# Patient Record
Sex: FEMALE | Race: WHITE | Employment: OTHER | URBAN - METROPOLITAN AREA
[De-identification: names, ages, dates, MRNs, and addresses within clinical notes are randomized per-mention and may not be internally consistent; named-entity substitution may affect disease eponyms.]

---

## 2017-05-10 ENCOUNTER — OFFICE VISIT (OUTPATIENT)
Dept: ORTHOPEDIC SURGERY | Age: 62
End: 2017-05-10

## 2017-05-10 VITALS
DIASTOLIC BLOOD PRESSURE: 76 MMHG | HEART RATE: 62 BPM | BODY MASS INDEX: 21.26 KG/M2 | WEIGHT: 120 LBS | HEIGHT: 63 IN | SYSTOLIC BLOOD PRESSURE: 149 MMHG

## 2017-05-10 DIAGNOSIS — M54.41 ACUTE RIGHT-SIDED LOW BACK PAIN WITH RIGHT-SIDED SCIATICA: ICD-10-CM

## 2017-05-10 DIAGNOSIS — M54.6 CHRONIC BILATERAL THORACIC BACK PAIN: Primary | ICD-10-CM

## 2017-05-10 DIAGNOSIS — G89.29 CHRONIC BILATERAL THORACIC BACK PAIN: Primary | ICD-10-CM

## 2017-05-10 PROCEDURE — 72100 X-RAY EXAM L-S SPINE 2/3 VWS: CPT | Performed by: NEUROLOGICAL SURGERY

## 2017-05-10 PROCEDURE — 99214 OFFICE O/P EST MOD 30 MIN: CPT | Performed by: NEUROLOGICAL SURGERY

## 2017-05-10 PROCEDURE — 72070 X-RAY EXAM THORAC SPINE 2VWS: CPT | Performed by: NEUROLOGICAL SURGERY

## 2017-06-05 ENCOUNTER — HOSPITAL ENCOUNTER (OUTPATIENT)
Dept: PHYSICAL THERAPY | Age: 62
Discharge: HOME | End: 2017-06-05
Admitting: NEUROLOGICAL SURGERY

## 2017-06-07 ENCOUNTER — HOSPITAL ENCOUNTER (OUTPATIENT)
Dept: PHYSICAL THERAPY | Age: 62
Discharge: HOME | End: 2017-06-07
Admitting: NEUROLOGICAL SURGERY

## 2017-06-13 ENCOUNTER — HOSPITAL ENCOUNTER (OUTPATIENT)
Dept: PHYSICAL THERAPY | Age: 62
Discharge: HOME | End: 2017-06-13
Admitting: NEUROLOGICAL SURGERY

## 2017-06-15 ENCOUNTER — HOSPITAL ENCOUNTER (OUTPATIENT)
Dept: PHYSICAL THERAPY | Age: 62
Discharge: HOME | End: 2017-06-15
Admitting: NEUROLOGICAL SURGERY

## 2017-06-19 ENCOUNTER — HOSPITAL ENCOUNTER (OUTPATIENT)
Dept: PHYSICAL THERAPY | Age: 62
Discharge: HOME | End: 2017-06-19
Admitting: NEUROLOGICAL SURGERY

## 2017-06-22 ENCOUNTER — HOSPITAL ENCOUNTER (OUTPATIENT)
Dept: PHYSICAL THERAPY | Age: 62
Discharge: HOME | End: 2017-06-22
Admitting: NEUROLOGICAL SURGERY

## 2017-06-23 ENCOUNTER — OFFICE VISIT (OUTPATIENT)
Dept: ORTHOPEDIC SURGERY | Age: 62
End: 2017-06-23

## 2017-06-23 VITALS
HEART RATE: 58 BPM | WEIGHT: 121 LBS | BODY MASS INDEX: 21.44 KG/M2 | SYSTOLIC BLOOD PRESSURE: 139 MMHG | HEIGHT: 63 IN | DIASTOLIC BLOOD PRESSURE: 73 MMHG

## 2017-06-23 DIAGNOSIS — G89.29 CHRONIC BILATERAL LOW BACK PAIN WITHOUT SCIATICA: ICD-10-CM

## 2017-06-23 DIAGNOSIS — M54.6 CHRONIC BILATERAL THORACIC BACK PAIN: Primary | ICD-10-CM

## 2017-06-23 DIAGNOSIS — M54.50 CHRONIC BILATERAL LOW BACK PAIN WITHOUT SCIATICA: ICD-10-CM

## 2017-06-23 DIAGNOSIS — G89.29 CHRONIC BILATERAL THORACIC BACK PAIN: Primary | ICD-10-CM

## 2017-06-23 PROCEDURE — 99213 OFFICE O/P EST LOW 20 MIN: CPT | Performed by: NEUROLOGICAL SURGERY

## 2017-06-23 RX ORDER — CIPROFLOXACIN 500 MG/1
TABLET, FILM COATED ORAL
COMMUNITY
Start: 2017-03-22 | End: 2018-09-09

## 2017-06-23 RX ORDER — TRAMADOL HYDROCHLORIDE 50 MG/1
50 TABLET ORAL EVERY 12 HOURS PRN
COMMUNITY
Start: 2017-06-01

## 2017-06-23 RX ORDER — METOPROLOL SUCCINATE 25 MG/1
25 TABLET, EXTENDED RELEASE ORAL DAILY
COMMUNITY
Start: 2017-06-12

## 2017-06-23 RX ORDER — METAXALONE 800 MG/1
TABLET ORAL
COMMUNITY
Start: 2017-06-12 | End: 2018-09-09

## 2017-06-23 RX ORDER — ETODOLAC 400 MG/1
TABLET, FILM COATED ORAL
COMMUNITY
Start: 2017-04-24 | End: 2018-09-09

## 2017-06-23 RX ORDER — ESTRADIOL AND NORETHINDRONE ACETATE 1; .5 MG/1; MG/1
0.5 TABLET ORAL DAILY
COMMUNITY
Start: 2017-04-12

## 2017-06-23 RX ORDER — METRONIDAZOLE 500 MG/1
TABLET ORAL
COMMUNITY
Start: 2017-03-22 | End: 2018-09-09

## 2017-06-23 RX ORDER — GABAPENTIN 300 MG/1
CAPSULE ORAL
COMMUNITY
Start: 2017-06-12 | End: 2018-09-09

## 2017-06-23 RX ORDER — POLYETHYLENE GLYCOL-3350, SODIUM CHLORIDE, POTASSIUM CHLORIDE AND SODIUM BICARBONATE 420; 11.2; 5.72; 1.48 G/438.4G; G/438.4G; G/438.4G; G/438.4G
POWDER, FOR SOLUTION ORAL
COMMUNITY
Start: 2017-05-22 | End: 2018-09-09

## 2017-06-26 ENCOUNTER — HOSPITAL ENCOUNTER (OUTPATIENT)
Dept: PHYSICAL THERAPY | Age: 62
Discharge: HOME | End: 2017-06-26
Admitting: NEUROLOGICAL SURGERY

## 2017-07-06 ENCOUNTER — HOSPITAL ENCOUNTER (OUTPATIENT)
Dept: PHYSICAL THERAPY | Age: 62
Discharge: HOME | End: 2017-07-06
Admitting: ORTHOPAEDIC SURGERY

## 2017-07-06 ENCOUNTER — HOSPITAL ENCOUNTER (OUTPATIENT)
Dept: PHYSICAL THERAPY | Age: 62
Discharge: HOME | End: 2017-06-30
Admitting: ORTHOPAEDIC SURGERY

## 2017-07-13 ENCOUNTER — HOSPITAL ENCOUNTER (OUTPATIENT)
Dept: PHYSICAL THERAPY | Age: 62
Discharge: HOME | End: 2017-07-13
Admitting: ORTHOPAEDIC SURGERY

## 2017-07-27 ENCOUNTER — HOSPITAL ENCOUNTER (OUTPATIENT)
Dept: PHYSICAL THERAPY | Age: 62
Discharge: HOME | End: 2017-07-27
Admitting: ORTHOPAEDIC SURGERY

## 2017-08-03 ENCOUNTER — HOSPITAL ENCOUNTER (OUTPATIENT)
Dept: PHYSICAL THERAPY | Age: 62
Discharge: HOME | End: 2017-08-03
Admitting: ORTHOPAEDIC SURGERY

## 2017-08-18 ENCOUNTER — HOSPITAL ENCOUNTER (OUTPATIENT)
Dept: MAMMOGRAPHY | Age: 62
Discharge: HOME | End: 2017-08-18
Attending: OBSTETRICS & GYNECOLOGY | Admitting: OBSTETRICS & GYNECOLOGY

## 2017-08-18 DIAGNOSIS — Z12.31 VISIT FOR SCREENING MAMMOGRAM: ICD-10-CM

## 2017-08-24 ENCOUNTER — HOSPITAL ENCOUNTER (OUTPATIENT)
Dept: PHYSICAL THERAPY | Age: 62
Discharge: HOME | End: 2017-08-24
Admitting: ORTHOPAEDIC SURGERY

## 2018-06-19 ENCOUNTER — HOSPITAL ENCOUNTER (OUTPATIENT)
Dept: PHYSICAL THERAPY | Age: 63
Discharge: HOME | End: 2018-06-30
Admitting: ORTHOPAEDIC SURGERY

## 2018-07-01 ENCOUNTER — HOSPITAL ENCOUNTER (OUTPATIENT)
Dept: PHYSICAL THERAPY | Age: 63
Discharge: HOME | End: 2018-07-01
Attending: ORTHOPAEDIC SURGERY | Admitting: ORTHOPAEDIC SURGERY

## 2018-07-05 ENCOUNTER — HOSPITAL ENCOUNTER (OUTPATIENT)
Dept: PHYSICAL THERAPY | Age: 63
Discharge: HOME | End: 2018-07-06
Admitting: ORTHOPAEDIC SURGERY

## 2018-07-10 ENCOUNTER — HOSPITAL ENCOUNTER (OUTPATIENT)
Dept: PHYSICAL THERAPY | Age: 63
Discharge: HOME | End: 2018-07-11
Admitting: ORTHOPAEDIC SURGERY

## 2018-07-17 ENCOUNTER — HOSPITAL ENCOUNTER (OUTPATIENT)
Dept: PHYSICAL THERAPY | Age: 63
Setting detail: THERAPIES SERIES
Discharge: HOME | End: 2018-07-17
Payer: COMMERCIAL

## 2018-07-17 NOTE — FLOWSHEET NOTE
[facility] [facility], [address] [place]  Phone: [phone]  Fax [phone]      Date:  2018    Patient Name:  Laura Junior    :  1955  MRN: [id number]  Restrictions/Precautions:    Medical/Treatment Diagnosis Information:  ·  Hist of Thoracolumbar back pain     Physician Information:       Patient is Post-Op [] Yes   [x] No     DOS:           7/10/18 7/17/18        Subjective Still doing good Hard a rough day on , just a lot of arthritic pain  Been stretching more at home and that seems to help                  Weeks Post-Op                    Objective Good hamstring flexibility, limited thoracic mobility, abdominal strength rated as fair Good hamstring flexibility, limited thoracic mobility, abdominal strength rated as fair Hamstrings L 100degrees                       R   95degrees  Difficulty with lumbar stab Good hamstring flexibility, limited thoracic mobility, abdominal strength showing improvements Good hamstring flexibility, limited thoracic mobility, abdominal strength showing improvements, hip disassociation showing improvement               Goals Reduce back pain, gain abdominal strength for spine and pelvic stability Reduce back pain, gain abdominal strength for spine and pelvic stability Reduce back pain, gain abdominal strength for spine and pelvic stability Reduce back pain, gain abdominal strength for spine and pelvic stability Reduce back pain, gain abdominal strength for spine and pelvic stability Reduce back pain, gain abdominal strength for spine and pelvic stability              Reformer Exercises 2R1B Sq w/add (neutral)  2R1B raises 2X10  2R1B U sq 25X   2R1B Sq w/add (neutral)  2R1B raises 2X15 w/stretch  2R1B U sq 30X 2R1B Sq w/add (neutral)  2R1B raises 3X10 w/ U str  2R1B U sq 30X 2R1B Sq w/add (neutral)  2R1B raises 3X10  2R1B U sq 2X20X 2R1B Sq w/add (neutral)  2R1B raises 3X10  2R1B U sq 2X20X 2R1B Sq w/add (neutral)  2R1B raises 3X10  2R1B U sq 2X20X 2R1B sq, raises, walks  2R1B U squats  3R plie   Pelvic Stabilization   1R stand ab/ad 1R1Y stand ab/ad 1R1Y stand ab/ad 1R1Y stand ab/ad #4 1R1Y stand ab/ad #4 1R1Y^ stand ab/ad #4 1R1B stand ab/ad                                 Trunk Stabilization 1R 90/90, alt legs, leg lifts 1R 90/90, alt legs, leg lifts, scissor 1R1Y 90/90, alt legs, leg lifts, scissor 1R1Y 90/90, alt legs, leg lifts, scissor  No flex 1R1Y 90/90, alt legs, leg lifts, scissor  1R flex 2X10 1R1B 90/90, alt legs, leg lifts, modified flex 1R1B alt legs, U leg liftsw, scissor  1R1B modified flex, obl              1R1B kneeling modified plank 1R1B kneeling modified plank 3X15 sec 1R1B kneeling modified plank 3X15 sec 1R1B kneeling modified plank [id number] 1R1B Plank 3X20 1R1B Plank 2X30              Hip Disassociation 2R1Y flossing  2R1Y arcs: ll, v 2R1B flossing  2R1B arcs: ll, v, circles 2R1B arcs: ll, v, circles 2R1B arcs: ll, v, circles 20X 2R1B arcs: ll, v, circles 20X 2R1B II, V, circles, ir/er 2R1B II, V, Circles, frog                    1R1B stand leg press             Scapular Stabilization Box: low row, ext Box: low row, ext, biceps Box: low row, ext, biceps Box: low row, ext, biceps   Box: low row, biceps, triceps                                 Thoracic Mobility 2R1Y 3X10 Bridge w/add 2R1Y 3X10 Bridge w/add 2R 3X10 Bridge w/add 2R 3X10 Bridge w/add w/ext 2R 3X10 Bridge w/add w/ex 2R1Y Bridge w/ext X10 2R1Y Bridge w/ext  2R1Y Bridge 2:1                  1R Box: spine ext 10X 1R Box prone ext 10X              General ROM Hams, pirif, gastroc Hams, pirif, gastroc Hams, pirif, gastroc       Hams, pirif, gastroc Hams, pirif, gastroc Hams, post capsule, lunge Hams, post capsule, lunge                                 Other HEP: TA alt legs, Bridge,  Hams stretch, kneeling alt legs HEP: TA alt legs, Bridge,  Hams stretch, kneeling alt legs Review HEP HEP: bridge, leg lifts prone and supine, hams str, pirif str  Review HEP                                  Summary/Comments                            Electronically signed by:  Margarita English, LAT, ATC

## 2018-07-24 ENCOUNTER — HOSPITAL ENCOUNTER (OUTPATIENT)
Dept: PHYSICAL THERAPY | Age: 63
Setting detail: THERAPIES SERIES
Discharge: HOME | End: 2018-07-24
Payer: COMMERCIAL

## 2018-07-24 NOTE — FLOWSHEET NOTE
kneeling alt legs Review HEP HEP: bridge, leg lifts prone and supine, hams str, pirif str  Review HEP                                  Summary/Comments                            Electronically signed by:  EVERETTE Guillaume, ATC

## 2018-08-02 ENCOUNTER — HOSPITAL ENCOUNTER (OUTPATIENT)
Dept: PHYSICAL THERAPY | Age: 63
Discharge: HOME | End: 2018-08-02
Payer: COMMERCIAL

## 2018-08-02 PROCEDURE — 9990000010 HC NO CHARGE VISIT: Performed by: SPECIALIST/TECHNOLOGIST

## 2018-08-16 ENCOUNTER — HOSPITAL ENCOUNTER (OUTPATIENT)
Dept: PHYSICAL THERAPY | Age: 63
Discharge: HOME | End: 2018-08-16
Payer: COMMERCIAL

## 2018-08-16 PROCEDURE — 9990000010 HC NO CHARGE VISIT: Performed by: SPECIALIST/TECHNOLOGIST

## 2018-08-16 NOTE — FLOWSHEET NOTE
str  2R1B U sq 30X 2R1B Sq w/add (neutral)  2R1B raises 3X10  2R1B U sq 2X20X 2R1B Sq w/add (neutral)  2R1B raises 3X10  2R1B U sq 2X20X 2R1B Sq w/add (neutral)  2R1B raises 3X10  2R1B U sq 2X20X 2R1B sq, raises, walks  2R1B U squats  3R plie   Pelvic Stabilization   1R stand ab/ad 1R1Y stand ab/ad 1R1Y stand ab/ad 1R1Y stand ab/ad #4 1R1Y stand ab/ad #4 1R1Y^ stand ab/ad #4 1R1B stand ab/ad                                 Trunk Stabilization 1R 90/90, alt legs, leg lifts 1R 90/90, alt legs, leg lifts, scissor 1R1Y 90/90, alt legs, leg lifts, scissor 1R1Y 90/90, alt legs, leg lifts, scissor  1R1Y flex, obl 1R1Y 90/90, alt legs, leg lifts, scissor  1R flex 2X10 1R1B 90/90, alt legs, leg lifts, modified flex 1R1B alt legs, U leg liftsw, scissor  1R1B modified flex, obl              1R1B kneeling modified plank 1R1B kneeling modified plank 3X15 sec 1R1B kneeling modified plank 3X15 sec 1R1B  plank 2X1min 1R1B Plank 3X20 1R1B Plank 2X30              Hip Disassociation 2R1Y flossing  2R1Y arcs: ll, v 2R1B flossing  2R1B arcs: ll, v, circles 2R1B arcs: ll, v, circles 2R1B arcs: ll, v, circles, IR/ER 15X 2R1B arcs: ll, v, circles 20X 2R1B II, V, circles, ir/er 2R1B II, V, Circles, frog                    1R1B stand leg press             Scapular Stabilization Box: low row, ext Box: low row, ext, biceps Box: low row, ext, biceps Box: low row, ext, biceps   Box: low row, biceps, triceps                                 Thoracic Mobility 2R1Y 3X10 Bridge w/add 2R1Y 3X10 Bridge w/add 2R 3X10 Bridge w/add 2R 3X10 Bridge w/ext 2R 3X10 Bridge w/add w/ex 2R1Y Bridge w/ext X10 2R1Y Bridge w/ext  2R1Y Bridge 2:1                  1R Box: spine ext 10X 1R Box prone ext 10X              General ROM Hams, pirif, gastroc Hams, pirif, gastroc Hams, pirif, gastroc       Hams, post capsule, gastroc Hams, pirif, gastroc Hams, post capsule, lunge Hams, post capsule, lunge                                 Other HEP: TA alt legs, Bridge,  Hams

## 2018-08-17 ENCOUNTER — HOSPITAL ENCOUNTER (OUTPATIENT)
Dept: MAMMOGRAPHY | Age: 63
Discharge: HOME | End: 2018-08-17
Payer: COMMERCIAL

## 2018-08-17 DIAGNOSIS — Z12.31 VISIT FOR SCREENING MAMMOGRAM: ICD-10-CM

## 2018-08-17 PROCEDURE — 77063 BREAST TOMOSYNTHESIS BI: CPT

## 2018-09-09 ENCOUNTER — HOSPITAL ENCOUNTER (EMERGENCY)
Age: 63
Discharge: HOME | End: 2018-09-09
Payer: COMMERCIAL

## 2018-09-09 ENCOUNTER — APPOINTMENT (OUTPATIENT)
Dept: ULTRASOUND IMAGING | Age: 63
End: 2018-09-09
Payer: COMMERCIAL

## 2018-09-09 VITALS
WEIGHT: 130 LBS | TEMPERATURE: 97.8 F | HEIGHT: 63 IN | SYSTOLIC BLOOD PRESSURE: 119 MMHG | HEART RATE: 74 BPM | RESPIRATION RATE: 11 BRPM | BODY MASS INDEX: 23.04 KG/M2 | OXYGEN SATURATION: 99 % | DIASTOLIC BLOOD PRESSURE: 71 MMHG

## 2018-09-09 DIAGNOSIS — R11.2 NAUSEA AND VOMITING, INTRACTABILITY OF VOMITING NOT SPECIFIED, UNSPECIFIED VOMITING TYPE: Primary | ICD-10-CM

## 2018-09-09 DIAGNOSIS — R10.13 EPIGASTRIC PAIN: ICD-10-CM

## 2018-09-09 LAB
A/G RATIO: 1.5 (ref 1.1–2.2)
ALBUMIN SERPL-MCNC: 4.4 G/DL (ref 3.4–5)
ALP BLD-CCNC: 59 U/L (ref 40–129)
ALT SERPL-CCNC: 16 U/L (ref 10–40)
ANION GAP SERPL CALCULATED.3IONS-SCNC: 14 MMOL/L (ref 3–16)
AST SERPL-CCNC: 24 U/L (ref 15–37)
BASOPHILS ABSOLUTE: 0 K/UL (ref 0–0.2)
BASOPHILS RELATIVE PERCENT: 0.4 %
BILIRUB SERPL-MCNC: 0.7 MG/DL (ref 0–1)
BILIRUBIN URINE: NEGATIVE
BLOOD, URINE: NEGATIVE
BUN BLDV-MCNC: 17 MG/DL (ref 7–20)
CALCIUM SERPL-MCNC: 8.8 MG/DL (ref 8.3–10.6)
CHLORIDE BLD-SCNC: 100 MMOL/L (ref 99–110)
CLARITY: CLEAR
CO2: 27 MMOL/L (ref 21–32)
COLOR: YELLOW
CREAT SERPL-MCNC: <0.5 MG/DL (ref 0.6–1.2)
EKG ATRIAL RATE: 76 BPM
EKG DIAGNOSIS: NORMAL
EKG P AXIS: 52 DEGREES
EKG P-R INTERVAL: 142 MS
EKG Q-T INTERVAL: 400 MS
EKG QRS DURATION: 92 MS
EKG QTC CALCULATION (BAZETT): 450 MS
EKG R AXIS: 39 DEGREES
EKG T AXIS: 31 DEGREES
EKG VENTRICULAR RATE: 76 BPM
EOSINOPHILS ABSOLUTE: 0 K/UL (ref 0–0.6)
EOSINOPHILS RELATIVE PERCENT: 0.5 %
EPITHELIAL CELLS, UA: NORMAL /HPF
GFR AFRICAN AMERICAN: >60
GFR NON-AFRICAN AMERICAN: >60
GLOBULIN: 3 G/DL
GLUCOSE BLD-MCNC: 123 MG/DL (ref 70–99)
GLUCOSE URINE: NEGATIVE MG/DL
HCT VFR BLD CALC: 41.6 % (ref 36–48)
HEMOGLOBIN: 14.4 G/DL (ref 12–16)
KETONES, URINE: NEGATIVE MG/DL
LEUKOCYTE ESTERASE, URINE: NEGATIVE
LIPASE: 20 U/L (ref 13–60)
LYMPHOCYTES ABSOLUTE: 0.9 K/UL (ref 1–5.1)
LYMPHOCYTES RELATIVE PERCENT: 9.7 %
MCH RBC QN AUTO: 33.6 PG (ref 26–34)
MCHC RBC AUTO-ENTMCNC: 34.6 G/DL (ref 31–36)
MCV RBC AUTO: 97.2 FL (ref 80–100)
MICROSCOPIC EXAMINATION: YES
MONOCYTES ABSOLUTE: 0.4 K/UL (ref 0–1.3)
MONOCYTES RELATIVE PERCENT: 4.6 %
NEUTROPHILS ABSOLUTE: 7.9 K/UL (ref 1.7–7.7)
NEUTROPHILS RELATIVE PERCENT: 84.8 %
NITRITE, URINE: NEGATIVE
PDW BLD-RTO: 13.1 % (ref 12.4–15.4)
PH UA: 6.5
PLATELET # BLD: 354 K/UL (ref 135–450)
PMV BLD AUTO: 6.4 FL (ref 5–10.5)
POTASSIUM SERPL-SCNC: 4.1 MMOL/L (ref 3.5–5.1)
PROTEIN UA: ABNORMAL MG/DL
RBC # BLD: 4.28 M/UL (ref 4–5.2)
RBC UA: NORMAL /HPF (ref 0–2)
SODIUM BLD-SCNC: 141 MMOL/L (ref 136–145)
SPECIFIC GRAVITY UA: 1.01
TOTAL PROTEIN: 7.4 G/DL (ref 6.4–8.2)
URINE TYPE: ABNORMAL
UROBILINOGEN, URINE: 0.2 E.U./DL
WBC # BLD: 9.3 K/UL (ref 4–11)
WBC UA: NORMAL /HPF (ref 0–5)

## 2018-09-09 PROCEDURE — 85025 COMPLETE CBC W/AUTO DIFF WBC: CPT

## 2018-09-09 PROCEDURE — 80053 COMPREHEN METABOLIC PANEL: CPT

## 2018-09-09 PROCEDURE — 83690 ASSAY OF LIPASE: CPT

## 2018-09-09 PROCEDURE — 6370000000 HC RX 637 (ALT 250 FOR IP): Performed by: NURSE PRACTITIONER

## 2018-09-09 PROCEDURE — 76705 ECHO EXAM OF ABDOMEN: CPT

## 2018-09-09 PROCEDURE — 93005 ELECTROCARDIOGRAM TRACING: CPT | Performed by: NURSE PRACTITIONER

## 2018-09-09 PROCEDURE — 99284 EMERGENCY DEPT VISIT MOD MDM: CPT

## 2018-09-09 PROCEDURE — 93010 ELECTROCARDIOGRAM REPORT: CPT | Performed by: INTERNAL MEDICINE

## 2018-09-09 PROCEDURE — 81001 URINALYSIS AUTO W/SCOPE: CPT

## 2018-09-09 RX ORDER — ONDANSETRON 4 MG/1
4 TABLET, FILM COATED ORAL EVERY 8 HOURS PRN
Qty: 20 TABLET | Refills: 0 | Status: SHIPPED | OUTPATIENT
Start: 2018-09-09

## 2018-09-09 RX ORDER — SUCRALFATE ORAL 1 G/10ML
1 SUSPENSION ORAL 4 TIMES DAILY
Qty: 1200 ML | Refills: 3 | Status: SHIPPED | OUTPATIENT
Start: 2018-09-09

## 2018-09-09 RX ORDER — OXYCODONE HYDROCHLORIDE AND ACETAMINOPHEN 5; 325 MG/1; MG/1
1 TABLET ORAL ONCE
Status: COMPLETED | OUTPATIENT
Start: 2018-09-09 | End: 2018-09-09

## 2018-09-09 RX ORDER — DICYCLOMINE HYDROCHLORIDE 10 MG/1
10 CAPSULE ORAL
Status: DISCONTINUED | OUTPATIENT
Start: 2018-09-09 | End: 2018-09-09 | Stop reason: HOSPADM

## 2018-09-09 RX ORDER — OMEPRAZOLE 40 MG/1
40 CAPSULE, DELAYED RELEASE ORAL DAILY
Qty: 30 CAPSULE | Refills: 0 | Status: SHIPPED | OUTPATIENT
Start: 2018-09-09

## 2018-09-09 RX ORDER — AMOXICILLIN AND CLAVULANATE POTASSIUM 875; 125 MG/1; MG/1
1 TABLET, FILM COATED ORAL 2 TIMES DAILY
Qty: 20 TABLET | Refills: 0 | Status: SHIPPED | OUTPATIENT
Start: 2018-09-09 | End: 2018-09-19

## 2018-09-09 RX ORDER — OXYCODONE HYDROCHLORIDE AND ACETAMINOPHEN 5; 325 MG/1; MG/1
1-2 TABLET ORAL EVERY 6 HOURS PRN
Qty: 12 TABLET | Refills: 0 | Status: SHIPPED | OUTPATIENT
Start: 2018-09-09 | End: 2018-09-16

## 2018-09-09 RX ADMIN — LIDOCAINE HYDROCHLORIDE: 20 SOLUTION ORAL; TOPICAL at 13:37

## 2018-09-09 RX ADMIN — DICYCLOMINE HYDROCHLORIDE 10 MG: 10 CAPSULE ORAL at 11:17

## 2018-09-09 RX ADMIN — OXYCODONE HYDROCHLORIDE AND ACETAMINOPHEN 1 TABLET: 5; 325 TABLET ORAL at 14:09

## 2018-09-09 ASSESSMENT — PAIN SCALES - GENERAL: PAINLEVEL_OUTOF10: 3

## 2018-09-09 NOTE — ED PROVIDER NOTES
[address] [place] [address]  ED  eMERGENCY dEPARTMENT eNCOUnter        Pt Name: Renae Sherman  MRN: [id number]  [place] 1955  Date of evaluation: 9/9/2018  Provider: ISAIAS White  PCP: General Dao MD      History provided by the patient. CHIEF COMPLAINT:     Chief Complaint   Patient presents with    Abdominal Pain     Pt woke at 0200 with vomiting and abd pain.  Emesis       HISTORY OF PRESENT ILLNESS:      Renae Sherman is a 58 y.o. female who presents to [address] [place] [address]  ED with complaints of Abdominal pain. Patient states that she woke up this morning about 2 AM with some vomiting and abdominal pain. Patient states that the abdominal pain is intermittent and crampy in nature. Patient states that her pain is mostly in the epigastric area and to the right upper quadrant. Patient states that she has a history of diverticulitis however this does not feel like that. Patient states that she's been unable to eat, she does not have her appendix. She denies any fever. Patient states that the pain is dull and constant however then she has intermittent sharp crampy pain. Patient states that she's also had some diarrhea today as well. She is here for further evaluation. Nursing Notes were reviewed     REVIEW OF SYSTEMS:     Review of Systems  All systems, a total of 10, are reviewed and negative except for those that were just noted in history present illness.         PAST MEDICAL HISTORY:     Past Medical History:   Diagnosis Date    High blood pressure          SURGICAL HISTORY:      Past Surgical History:   Procedure Laterality Date    APPENDECTOMY      BUNIONECTOMY      HAND SURGERY           CURRENT MEDICATIONS:       Discharge Medication List as of 9/9/2018  1:23 PM      CONTINUE these medications which have NOT CHANGED    Details   estradiol-norethindrone (ACTIVELLA) 1-0.5 MG per tablet Take 0.5 mg by mouth daily Historical Med      metoprolol succinate (TOPROL XL) 25 MG extended release tablet Take 25 mg by mouth daily Historical Med      traMADol (ULTRAM) 50 MG tablet Take 50 mg by mouth every 12 hours as needed. Elkomiladis Wilkersona Historical Med      amLODIPine (NORVASC) 5 MG tablet Take 5 mg by mouth daily. Historical Med               ALLERGIES:    Patient has no known allergies. FAMILY HISTORY:       Family History   Problem Relation Age of Onset    Stroke Mother     Stroke Father           SOCIAL HISTORY:       Social History     Social History    Marital status:      Spouse name: N/A    Number of children: 0    Years of education: N/A     Occupational History    Mgr      Social History Main Topics    Smoking status: Never Smoker    Smokeless tobacco: Never Used    Alcohol use Yes    Drug use: No    Sexual activity: Not on file     Other Topics Concern    Not on file     Social History Narrative    No narrative on file       SCREENINGS:    [place] Coma Scale  Eye Opening: Spontaneous  Best Verbal Response: Oriented  Best Motor Response: Obeys commands  [place]ann[place] Coma Scale Score: 15        PHYSICAL EXAM:       ED Triage Vitals [09/09/18 1052]   BP Temp Temp Source Pulse Resp SpO2 Height Weight   (!) 148/84 97.8 °F (36.6 °C) Oral 81 20 98 % 5' 3\" (1.6 m) 130 lb (59 kg)       Physical Exam    CONSTITUTIONAL: Awake and alert. Cooperative. Well-developed. Well-nourished. Non-toxic. No acute distress. Vitals:    09/09/18 1052 09/09/18 1204 09/09/18 1338   BP: (!) 148/84 130/67 119/71   Pulse: 81 70 74   Resp: 20 17 11   Temp: 97.8 °F (36.6 °C)     TempSrc: Oral     SpO2: 98% 96% 99%   Weight: 130 lb (59 kg)     Height: 5' 3\" (1.6 m)       HENT: Normocephalic. Atraumatic. External ears normal, without discharge. TMs clear bilaterally. No nasal discharge. Oropharynx clear, no erythema. Mucous membranes moist.  EYES: Conjunctiva non-injected, no lid abnormalities noted. No scleral icterus. PERRL. EOM's grossly intact. Anterior chambers clear. NECK: Supple. Normal ROM. No meningismus. No thyroid tenderness or swelling noted. CARDIOVASCULAR: RRR. No Murmer. Intact distal pulses with no edema. No carotid bruits. PULMONARY/CHEST WALL: Effort normal. No tachypnea. Lungs clear to ausculation. ABDOMEN: Normal BS. Soft. Nondistended. Some diffuse very mild tenderness, there is no focalized tenderness to palpation. No guarding. No hernias noted. No splenomegaly. Back: Spine is midline. No ecchymosis. No crepitus on palpation. No obvious subluxation of vertebral column. No saddle anesthesia or evidence of cauda equina. /ANORECTAL: Not assessed  MUSKULOSKELETAL: Normal ROM. No acute deformities. No edema. No tenderness to palpate. SKIN: Warm and dry. NEUROLOGICAL:  GCS 15. CN II-XII grossly intact. Strength is 5/5 in all extremities and sensation is intact. DTRs normal and symmetric. PSYCHIATRIC: Normal affect, normal insight and judgement. Alert and oriented x 3.         DIAGNOSTIC RESULTS:     LABS:    Results for orders placed or performed during the hospital encounter of 09/09/18   CBC Auto Differential   Result Value Ref Range    WBC 9.3 4.0 - 11.0 K/uL    RBC 4.28 4.00 - 5.20 M/uL    Hemoglobin 14.4 12.0 - 16.0 g/dL    Hematocrit 41.6 36.0 - 48.0 %    MCV 97.2 80.0 - 100.0 fL    MCH 33.6 26.0 - 34.0 pg    MCHC 34.6 31.0 - 36.0 g/dL    RDW 13.1 12.4 - 15.4 %    Platelets 500 492 - 174 K/uL    MPV 6.4 5.0 - 10.5 fL    Neutrophils % 84.8 %    Lymphocytes % 9.7 %    Monocytes % 4.6 %    Eosinophils % 0.5 %    Basophils % 0.4 %    Neutrophils # 7.9 (H) 1.7 - 7.7 K/uL    Lymphocytes # 0.9 (L) 1.0 - 5.1 K/uL    Monocytes # 0.4 0.0 - 1.3 K/uL    Eosinophils # 0.0 0.0 - 0.6 K/uL    Basophils # 0.0 0.0 - 0.2 K/uL   Comprehensive Metabolic Panel   Result Value Ref Range    Sodium 141 136 - 145 mmol/L    Potassium 4.1 3.5 - 5.1 mmol/L    Chloride 100 99 - 110 mmol/L    CO2 27 21 - 32 mmol/L    Anion Gap 14 3 - 16    Glucose 123 (H) 70 - 99 mg/dL    BUN 17 7 - 20 mg/dL

## 2019-08-20 ENCOUNTER — HOSPITAL ENCOUNTER (OUTPATIENT)
Dept: MAMMOGRAPHY | Age: 64
Discharge: HOME | End: 2019-08-20
Payer: MEDICARE

## 2019-08-20 DIAGNOSIS — Z12.31 VISIT FOR SCREENING MAMMOGRAM: ICD-10-CM

## 2019-08-20 PROCEDURE — 77063 BREAST TOMOSYNTHESIS BI: CPT

## 2020-08-26 ENCOUNTER — HOSPITAL ENCOUNTER (OUTPATIENT)
Dept: MAMMOGRAPHY | Age: 65
Discharge: HOME | End: 2020-08-26
Payer: MEDICARE

## 2020-08-26 PROCEDURE — 77063 BREAST TOMOSYNTHESIS BI: CPT

## 2021-08-31 ENCOUNTER — HOSPITAL ENCOUNTER (OUTPATIENT)
Dept: MAMMOGRAPHY | Age: 66
Discharge: HOME | End: 2021-08-31
Payer: MEDICARE

## 2021-08-31 VITALS — WEIGHT: 140 LBS | HEIGHT: 63 IN | BODY MASS INDEX: 24.8 KG/M2

## 2021-08-31 DIAGNOSIS — Z12.31 VISIT FOR SCREENING MAMMOGRAM: ICD-10-CM

## 2021-08-31 PROCEDURE — 77063 BREAST TOMOSYNTHESIS BI: CPT

## 2022-09-01 ENCOUNTER — HOSPITAL ENCOUNTER (OUTPATIENT)
Dept: MAMMOGRAPHY | Age: 67
Discharge: HOME | End: 2022-09-01
Payer: MEDICARE

## 2022-09-01 VITALS — BODY MASS INDEX: 24.8 KG/M2 | WEIGHT: 140 LBS | HEIGHT: 63 IN

## 2022-09-01 DIAGNOSIS — Z12.31 VISIT FOR SCREENING MAMMOGRAM: ICD-10-CM

## 2022-09-01 PROCEDURE — 77063 BREAST TOMOSYNTHESIS BI: CPT

## 2023-08-04 ENCOUNTER — HOSPITAL ENCOUNTER (OUTPATIENT)
Dept: MAMMOGRAPHY | Age: 68
Discharge: HOME | End: 2023-08-04
Payer: MEDICARE

## 2023-08-04 VITALS — WEIGHT: 125 LBS | BODY MASS INDEX: 22.15 KG/M2 | HEIGHT: 63 IN

## 2023-08-04 DIAGNOSIS — Z12.31 VISIT FOR SCREENING MAMMOGRAM: ICD-10-CM

## 2023-08-04 PROCEDURE — 77063 BREAST TOMOSYNTHESIS BI: CPT

## 2023-08-14 ENCOUNTER — HOSPITAL ENCOUNTER (OUTPATIENT)
Dept: WOMENS IMAGING | Age: 68
Discharge: HOME | End: 2023-08-14
Payer: MEDICARE

## 2023-08-14 DIAGNOSIS — R92.8 ABNORMAL MAMMOGRAM: ICD-10-CM

## 2023-08-14 PROCEDURE — G0279 TOMOSYNTHESIS, MAMMO: HCPCS

## 2023-08-14 PROCEDURE — 76642 ULTRASOUND BREAST LIMITED: CPT

## 2023-08-14 NOTE — PROGRESS NOTES
[address] [place] [address]   [address]   Phone: [phone]         ULTRASOUND BIOPSY EDUCATION    NAME:  Jude Ness OF BIRTH:  1955   MEDICAL RECORD NUMBER:  [id number]   TODAY'S DATE:  8/14/2023    Referring Physician: Dr. Bell Angeles    Procedure: U/S Core Bx    Right breast    Date of biopsy: 8/17/23    Patient taking blood thinners: no    Medicine allergies: no    Special Instructions: n/a    Biopsy order form faxed to referring MD.          What is an Ultrasound Guided Breast Biopsy? Ultrasound guided breast biopsy is a test that uses ultrasound to find an area of your breast where a tissue sample will be taken. The sample is then looked at under a microscope to check for signs of breast cancer. Why is it done? An Ultrasound biopsy is usually done to check for cancer in a lump or cyst found during a mammogram or ultrasound. Preparing for the test?     * Take your medications as prescribed    You may eat and drink fluids before the test    Take a shower the evening or morning before the biopsy. What happens before the test?  Images are taken to find the exact site to be biopsied. Your skin is washed with an alcohol prep. You will be given an injection of medication to numb your breast.   What happens during the test?     Once your breast is numb, a small cut (incision) is made. Using the imaging, the doctor will guide the needle into the biopsy area. A sample of breast tissue is taken through the needle. A small \"Clip\" or Marker is inserted into your breast to zackary the biopsy site. The needle is removed and pressure put on the needle site to stop any bleeding. A bandage will be placed over the site. A post mammogram picture will be taken to document the clip placement. How long does the test take? Approximately 60 minutes.   Most of the time is spent finding the area for the

## 2023-08-17 ENCOUNTER — HOSPITAL ENCOUNTER (OUTPATIENT)
Dept: WOMENS IMAGING | Age: 68
Discharge: HOME | End: 2023-08-17
Payer: MEDICARE

## 2023-08-17 DIAGNOSIS — R92.8 ABNORMAL MAMMOGRAM: ICD-10-CM

## 2023-08-17 PROCEDURE — 88360 TUMOR IMMUNOHISTOCHEM/MANUAL: CPT

## 2023-08-17 PROCEDURE — 88342 IMHCHEM/IMCYTCHM 1ST ANTB: CPT

## 2023-08-17 PROCEDURE — 88305 TISSUE EXAM BY PATHOLOGIST: CPT

## 2023-08-17 PROCEDURE — 19083 BX BREAST 1ST LESION US IMAG: CPT

## 2023-08-17 PROCEDURE — G0279 TOMOSYNTHESIS, MAMMO: HCPCS

## 2023-08-17 NOTE — PROGRESS NOTES
Patient in [address] [facility] for breast biopsy. Radiologist reviewed procedure with patient, consent signed. Patient tolerated procedure well. Compression held. Site cleansed with chloraprep, steri strips and dry dressing applied. Ice pack provided. Reviewed discharge instructions with patient. Patient verbalized understanding and agreed to contact the Breast Navigator with any questions. Patient was A&Ox3 and steady on feet and discharged to waiting area. The [address]   Discharge Instructions  [facility]  [address]  Telephone: [phone]    NAME:  Tiffany Garrett OF BIRTH:  1955  GENDER: female  MEDICAL RECORD NUMBER:  [id number]  TODAY'S DATE:  8/17/2023    Discharge Instructions Breast Center:    Post Breast Biopsy Instructions     [x] You may remove your outer dressing in 24 hours and you may shower. The surgical glue will fall off after 7 days. Do not pick, scratch, or rub the film. [x] Place a cold pack inside your bra on top of the dressing for at least 3 hours, removing it every 15 minutes for 15 minutes after your biopsy. [x] Wear a firm fitting bra for at least 24 hours after your biopsy, including while you sleep. [x] Place an ice pack inside your bra on top of the dressing for at least 3 hours, removing it every 15 minutes for 15 minutes after your biopsy. [x] You may resume your held medications tomorrow, unless otherwise directed by your physician. [x] Your physician has instructed you to take Tylenol (Acetaminophen) the day of your biopsy for any discomfort. [x] Watch for excessive bleeding. If bleeding occurs apply pressure to site. Watch for signs of infection, increased pain, redness, swelling and heat. If this occurs call your physician. [x] Do not participate in any strenuous exercise for 48 hours after your biopsy and do not lift, pull or push anything over 5-10 lbs.       [x] Results

## 2023-08-18 ENCOUNTER — TELEPHONE (OUTPATIENT)
Dept: WOMENS IMAGING | Age: 68
End: 2023-08-18

## 2023-08-18 NOTE — TELEPHONE ENCOUNTER
Pathology for breast biopsy complete, radiologist confirms concordance. Reviewed breast biopsy results with patient and answered all questions. The radiologist is recommending that the patient see a breast surgeon regarding biopsy results. Per patient request, referral made to [facility]. Path report faxed to Claritza Jay MD.   Breast Navigator will remain available for any questions. Pt verbalized understanding.       Melissa Aldana RN

## 2023-08-21 ENCOUNTER — TRANSCRIBE ORDERS (OUTPATIENT)
Dept: ADMINISTRATIVE | Age: 68
End: 2023-08-21

## 2023-08-21 DIAGNOSIS — C50.411 CARCINOMA OF UPPER-OUTER QUADRANT OF RIGHT BREAST IN FEMALE, ESTROGEN RECEPTOR POSITIVE (HCC): Primary | ICD-10-CM

## 2023-08-21 DIAGNOSIS — Z17.0 CARCINOMA OF UPPER-OUTER QUADRANT OF RIGHT BREAST IN FEMALE, ESTROGEN RECEPTOR POSITIVE (HCC): Primary | ICD-10-CM

## 2023-08-25 ENCOUNTER — HOSPITAL ENCOUNTER (OUTPATIENT)
Dept: MRI IMAGING | Age: 68
Discharge: HOME | End: 2023-08-25
Attending: SURGERY
Payer: MEDICARE

## 2023-08-25 DIAGNOSIS — C50.411 CARCINOMA OF UPPER-OUTER QUADRANT OF RIGHT BREAST IN FEMALE, ESTROGEN RECEPTOR POSITIVE (HCC): ICD-10-CM

## 2023-08-25 DIAGNOSIS — Z17.0 CARCINOMA OF UPPER-OUTER QUADRANT OF RIGHT BREAST IN FEMALE, ESTROGEN RECEPTOR POSITIVE (HCC): ICD-10-CM

## 2023-08-25 LAB
PERFORMED ON: ABNORMAL
POC CREATININE: 0.4 MG/DL (ref 0.6–1.2)
POC SAMPLE TYPE: ABNORMAL

## 2023-08-25 PROCEDURE — A9579 GAD-BASE MR CONTRAST NOS,1ML: HCPCS | Performed by: SURGERY

## 2023-08-25 PROCEDURE — 6360000004 HC RX CONTRAST MEDICATION: Performed by: SURGERY

## 2023-08-25 PROCEDURE — C8908 MRI W/O FOL W/CONT, BREAST,: HCPCS

## 2023-08-25 PROCEDURE — 82565 ASSAY OF CREATININE: CPT

## 2023-08-25 RX ADMIN — GADOTERIDOL 10 ML: 279.3 INJECTION, SOLUTION INTRAVENOUS at 11:53

## 2023-08-30 ENCOUNTER — HOSPITAL ENCOUNTER (OUTPATIENT)
Dept: WOMENS IMAGING | Age: 68
Discharge: HOME | End: 2023-08-30
Payer: MEDICARE

## 2023-08-30 DIAGNOSIS — R92.8 ABNORMAL MAMMOGRAM: ICD-10-CM

## 2023-08-30 PROCEDURE — A4648 IMPLANTABLE TISSUE MARKER: HCPCS

## 2023-08-30 PROCEDURE — 76098 X-RAY EXAM SURGICAL SPECIMEN: CPT

## 2023-08-30 PROCEDURE — 19081 BX BREAST 1ST LESION STRTCTC: CPT

## 2023-08-30 PROCEDURE — 77065 DX MAMMO INCL CAD UNI: CPT

## 2023-08-30 PROCEDURE — 88305 TISSUE EXAM BY PATHOLOGIST: CPT

## 2023-08-30 NOTE — PROGRESS NOTES
will be available in 2-3 working days. Your referring physician or the Nurse Navigator will call you with results. The Breast Center Information:   Should you experience any significant changes in your health or have questions about your care, please contact:  Yaritza Xiong Nurse Breast Navigator with [facility]  [phone]  Monday-Friday. If you need help with your care outside these hours and cannot wait until we are again available, contact your Physician or go to the hospital emergency room.         Electronically signed by Yaritza Xiong RN on 8/30/2023 at 1:25 PM

## 2023-08-31 ENCOUNTER — TELEPHONE (OUTPATIENT)
Dept: WOMENS IMAGING | Age: 68
End: 2023-08-31

## 2023-08-31 NOTE — TELEPHONE ENCOUNTER
Patient called back stating she did receive my VM. All questions answered. [facility] will reach out to patient for the next steps.  Eloise Eaton RN

## 2023-08-31 NOTE — TELEPHONE ENCOUNTER
LVM with benign breast biopsy results. LM that Dr. Cachorro Carpenter office will reach out to patient for the next steps. Pt can call NN with any questions.  Girma Courtney RN

## 2023-09-12 NOTE — PROGRESS NOTES
Jocelyn Akhtar    Age 70 y.o.    female    1955    MRN [id number]    10/17/2023  Arrival Time_____________  OR Time____________[address]     Procedure(s):  RIGHT RADIOFREQUENCY IDENTIFICATION TAG LOCALIZED PARTIAL MASTECTOMY, SENTINEL LYMPH NODE BIOPSY                      General   Surgeon(s):  Galina Stahl, DO      DAY ADMIT ___  SDS/OP ___  OUTPT IN BED ___        Phone [phone] (home) [phone] (work)    PCP _____________________ Phone_________________ Claudean Stalling ( ) Epic CE ( ) Appt ________    ADDITIONAL INFO __________________________________ Cardio/Consult _____________    NOTES _____________________________________________________________________    ____________________________________________________________________________    PAT APPT DATE:________ TIME: ________  FAXED QAD: _______  (__) H&P w/ Hospitalist  __________________________________________________________________________  Preop Nurse phone screen complete: _____________  (__) CBC     (__) W/ DIFF ___________     (__) Hgb A1C    ___________  (__) CHEST X RAY   __________  (__) LIPID PROFILE  ___________  (__) EKG   __________  (__) PT-INR / APTT  ___________  (__) PFT's   __________  (__) BMP   ___________  (__) CAROTIDS  __________  (__) CMP   ___________  (__) VEIN MAPPING  __________  (__) U/A   ___________  (__) HISTORY & PHYSICAL __________  (__) URINE C & S  ___________  (__) CARDIAC CLEARANCE __________  (__) U/A W/ FLEX  ___________  (__) PULM.  CLEARANCE __________  (__) SERUM PREGNANCY ___________  (__) Check Epic DOS orders __________  (__) TYPE & SCREEN __________repeat ( ) (__)  __________________ __________  (__) Albumin / Prealbumin ___________  (__)  __________________ __________  (__) TRANSFERRIN  ___________  (__)  __________________ __________  (__) LIVER PROFILE  ___________  (__)  __________________ __________  (__) MRSA NASAL SWAB ___________  (__) URINE PREG DOS __________  (__) SED RATE  ___________  (__) BLOOD SUGAR DOS __________  (__) C-REACTIVE PROTEIN ___________    (__) VITAMIN D HYDROXY ___________  (__) [address]    ________________________    (__) ACE/ ARBS: _____________________     (__) BETABLOCKERS __________________    Ride home/Contact #__________________________

## 2023-10-10 ENCOUNTER — HOSPITAL ENCOUNTER (OUTPATIENT)
Dept: WOMENS IMAGING | Age: 68
Discharge: HOME | End: 2023-10-10
Payer: MEDICARE

## 2023-10-10 DIAGNOSIS — R92.8 ABNORMAL MAMMOGRAM: ICD-10-CM

## 2023-10-10 DIAGNOSIS — C50.911 MALIGNANT NEOPLASM OF RIGHT FEMALE BREAST, UNSPECIFIED ESTROGEN RECEPTOR STATUS, UNSPECIFIED SITE OF BREAST (HCC): ICD-10-CM

## 2023-10-10 PROCEDURE — 19285 PERQ DEV BREAST 1ST US IMAG: CPT

## 2023-10-10 PROCEDURE — G0279 TOMOSYNTHESIS, MAMMO: HCPCS

## 2023-10-10 RX ORDER — NICOTINE POLACRILEX 2 MG
1 GUM BUCCAL DAILY
COMMUNITY

## 2023-10-10 RX ORDER — VITAMIN E 268 MG
400 CAPSULE ORAL DAILY
COMMUNITY

## 2023-10-10 RX ORDER — METAXALONE 800 MG/1
800 TABLET ORAL 3 TIMES DAILY PRN
COMMUNITY

## 2023-10-10 NOTE — PROGRESS NOTES
Patient in [address] [facility] for breast RFID placement. Radiologist reviewed procedure with patient, consent signed. Patient tolerated procedure well. Compression held. Site cleansed with chloraprep, steri strips and dry dressing applied. Ice pack provided. Reviewed discharge instructions with patient. Patient verbalized understanding and agreed to contact the Breast Navigator with any questions. Patient was A&O x 3, steady on feet, and discharged to waiting area. The [address]   Discharge Instructions  [facility]  [address]  Telephone: [phone]     Apply an ice pack for 15-20 minutes after your procedure for at least one hour. You may remove your outer dressing in 24 hours and you may shower. The surgical glue will fall off after 7 days. Do not pick, scratch, or rub the film. You may return to work after the tag placement with the following restrictions: no lifting, pulling, or pushing anything over 5 lbs for the rest of the day. Please refrain from repetitive movement on the affected side for the rest of the day. Watch for signs of infection: swelling, pain fever, tenderness, heat or redness around the site. Do not soak in a hot tub, pool, or bath until the site has healed. The local anesthetic wears off about 3 hours after the procedure. You may use Tylenol for discomfort if needed. Bruising and tenderness are normal following the procedure. You may resume all stopped medications unless otherwise indicated by your Breast Surgeon. For any questions, please call the Nurse Navigator, Darcy Downing, for any issues at: [phone].      Thank you! -[address]

## 2023-10-11 NOTE — PROGRESS NOTES
Surgery Date and Time: 10/17/23 @ 10:45 am            Arrival Time:  8:45 am    The instructions given when and if a patient needs to stop oral intake prior to surgery varies. Follow the instructions you were given by your surgeon or RN during   Pre-op call. __X__Nothing to eat or to drink after Midnight the night before the surgery. NO gum, mints, candy or ice chips day of surgery. Only take the following medications with a small sip of water the morning of surgery:  NONE (take Metoprolol & Amlodipine night before as usual)      Aspirin, Ibuprofen, Advil, Naproxen, Vitamin E and other Anti-inflammatory products and supplements should be stopped for 5 -7days before surgery or as  directed     by your physician. HOLD VITAMIN E. Do not smoke or vape, and do not drink any alcoholic beverages 24 hours prior to surgery, this includes NA Beer. Refrain from using any recreational drugs,    including non-prescribed prescription drugs. You may brush your teeth and gargle the morning of surgery. DO NOT SWALLOW WATER. You MUST plan for a responsible adult to stay on site while you are here and take you home after your surgery. You will not be allowed to leave alone or drive               yourself home. It is requested someone stay with you the first 24 hrs. Your surgery will be cancelled if you do not have a ride home with a responsible adult. Please wear simple, loose-fitting clothing to the hospital. Rodney Capes not bring valuables (money, credit cards, checkbooks, etc.) Do not wear any makeup (including NO eye               makeup) and no nail polish if applicable. DO NOT wear any jewelry or body piercings day of surgery. All body piercing jewelry must be removed. If you have dentures they will be removed before going to the OR; we will provide a container.   If you wear contact lenses or glasses, they will be removed; please               bring a case for them

## 2023-10-16 ENCOUNTER — ANESTHESIA EVENT (OUTPATIENT)
Dept: OPERATING ROOM | Age: 68
End: 2023-10-16
Payer: MEDICARE

## 2023-10-17 ENCOUNTER — HOSPITAL ENCOUNTER (OUTPATIENT)
Dept: NUCLEAR MEDICINE | Age: 68
Discharge: HOME | End: 2023-10-17
Attending: SURGERY
Payer: MEDICARE

## 2023-10-17 ENCOUNTER — HOSPITAL ENCOUNTER (OUTPATIENT)
Age: 68
Setting detail: OUTPATIENT SURGERY
Discharge: HOME | End: 2023-10-17
Attending: SURGERY | Admitting: SURGERY
Payer: MEDICARE

## 2023-10-17 ENCOUNTER — ANESTHESIA (OUTPATIENT)
Dept: OPERATING ROOM | Age: 68
End: 2023-10-17
Payer: MEDICARE

## 2023-10-17 ENCOUNTER — HOSPITAL ENCOUNTER (OUTPATIENT)
Dept: WOMENS IMAGING | Age: 68
Discharge: HOME | End: 2023-10-17
Payer: MEDICARE

## 2023-10-17 VITALS
OXYGEN SATURATION: 98 % | RESPIRATION RATE: 23 BRPM | HEIGHT: 63 IN | BODY MASS INDEX: 21.97 KG/M2 | SYSTOLIC BLOOD PRESSURE: 152 MMHG | HEART RATE: 75 BPM | DIASTOLIC BLOOD PRESSURE: 66 MMHG | TEMPERATURE: 97.4 F | WEIGHT: 124 LBS

## 2023-10-17 DIAGNOSIS — C50.911 MALIGNANT NEOPLASM OF RIGHT FEMALE BREAST, UNSPECIFIED ESTROGEN RECEPTOR STATUS, UNSPECIFIED SITE OF BREAST (HCC): ICD-10-CM

## 2023-10-17 DIAGNOSIS — C50.411 MALIGNANT NEOPLASM OF UPPER-OUTER QUADRANT OF RIGHT FEMALE BREAST, UNSPECIFIED ESTROGEN RECEPTOR STATUS (HCC): ICD-10-CM

## 2023-10-17 PROCEDURE — 7100000010 HC PHASE II RECOVERY - FIRST 15 MIN: Performed by: SURGERY

## 2023-10-17 PROCEDURE — 6370000000 HC RX 637 (ALT 250 FOR IP): Performed by: ANESTHESIOLOGY

## 2023-10-17 PROCEDURE — 7100000000 HC PACU RECOVERY - FIRST 15 MIN: Performed by: SURGERY

## 2023-10-17 PROCEDURE — 2720000010 HC SURG SUPPLY STERILE: Performed by: SURGERY

## 2023-10-17 PROCEDURE — 3600000005 HC SURGERY LEVEL 5 BASE: Performed by: SURGERY

## 2023-10-17 PROCEDURE — 2500000003 HC RX 250 WO HCPCS: Performed by: ANESTHESIOLOGY

## 2023-10-17 PROCEDURE — 6360000002 HC RX W HCPCS: Performed by: NURSE ANESTHETIST, CERTIFIED REGISTERED

## 2023-10-17 PROCEDURE — 88341 IMHCHEM/IMCYTCHM EA ADD ANTB: CPT

## 2023-10-17 PROCEDURE — 2580000003 HC RX 258: Performed by: SURGERY

## 2023-10-17 PROCEDURE — 2580000003 HC RX 258: Performed by: ANESTHESIOLOGY

## 2023-10-17 PROCEDURE — A4217 STERILE WATER/SALINE, 500 ML: HCPCS | Performed by: SURGERY

## 2023-10-17 PROCEDURE — 6360000002 HC RX W HCPCS: Performed by: ANESTHESIOLOGY

## 2023-10-17 PROCEDURE — 6360000002 HC RX W HCPCS: Performed by: SURGERY

## 2023-10-17 PROCEDURE — 76098 X-RAY EXAM SURGICAL SPECIMEN: CPT

## 2023-10-17 PROCEDURE — 88342 IMHCHEM/IMCYTCHM 1ST ANTB: CPT

## 2023-10-17 PROCEDURE — 2500000003 HC RX 250 WO HCPCS: Performed by: NURSE ANESTHETIST, CERTIFIED REGISTERED

## 2023-10-17 PROCEDURE — 3600000015 HC SURGERY LEVEL 5 ADDTL 15MIN: Performed by: SURGERY

## 2023-10-17 PROCEDURE — 3700000000 HC ANESTHESIA ATTENDED CARE: Performed by: SURGERY

## 2023-10-17 PROCEDURE — 88307 TISSUE EXAM BY PATHOLOGIST: CPT

## 2023-10-17 PROCEDURE — 3700000001 HC ADD 15 MINUTES (ANESTHESIA): Performed by: SURGERY

## 2023-10-17 PROCEDURE — 7100000011 HC PHASE II RECOVERY - ADDTL 15 MIN: Performed by: SURGERY

## 2023-10-17 PROCEDURE — 78195 LYMPH SYSTEM IMAGING: CPT

## 2023-10-17 PROCEDURE — 3430000000 HC RX DIAGNOSTIC RADIOPHARMACEUTICAL: Performed by: SURGERY

## 2023-10-17 PROCEDURE — A9520 TC99 TILMANOCEPT DIAG 0.5MCI: HCPCS | Performed by: SURGERY

## 2023-10-17 PROCEDURE — 88305 TISSUE EXAM BY PATHOLOGIST: CPT

## 2023-10-17 PROCEDURE — 7100000001 HC PACU RECOVERY - ADDTL 15 MIN: Performed by: SURGERY

## 2023-10-17 PROCEDURE — 2709999900 HC NON-CHARGEABLE SUPPLY: Performed by: SURGERY

## 2023-10-17 RX ORDER — LIDOCAINE HYDROCHLORIDE 20 MG/ML
INJECTION, SOLUTION INFILTRATION; PERINEURAL PRN
Status: DISCONTINUED | OUTPATIENT
Start: 2023-10-17 | End: 2023-10-17 | Stop reason: SDUPTHER

## 2023-10-17 RX ORDER — FAMOTIDINE 10 MG/ML
20 INJECTION, SOLUTION INTRAVENOUS 2 TIMES DAILY
Status: DISCONTINUED | OUTPATIENT
Start: 2023-10-17 | End: 2023-10-17 | Stop reason: HOSPADM

## 2023-10-17 RX ORDER — LIDOCAINE HYDROCHLORIDE 10 MG/ML
2 INJECTION, SOLUTION INFILTRATION; PERINEURAL
Status: DISCONTINUED | OUTPATIENT
Start: 2023-10-17 | End: 2023-10-17 | Stop reason: HOSPADM

## 2023-10-17 RX ORDER — MAGNESIUM HYDROXIDE 1200 MG/15ML
LIQUID ORAL CONTINUOUS PRN
Status: COMPLETED | OUTPATIENT
Start: 2023-10-17 | End: 2023-10-17

## 2023-10-17 RX ORDER — LABETALOL HYDROCHLORIDE 5 MG/ML
10 INJECTION, SOLUTION INTRAVENOUS
Status: DISCONTINUED | OUTPATIENT
Start: 2023-10-17 | End: 2023-10-17 | Stop reason: HOSPADM

## 2023-10-17 RX ORDER — PROPOFOL 10 MG/ML
INJECTION, EMULSION INTRAVENOUS PRN
Status: DISCONTINUED | OUTPATIENT
Start: 2023-10-17 | End: 2023-10-17 | Stop reason: SDUPTHER

## 2023-10-17 RX ORDER — BUPIVACAINE HYDROCHLORIDE 5 MG/ML
INJECTION, SOLUTION EPIDURAL; INTRACAUDAL PRN
Status: DISCONTINUED | OUTPATIENT
Start: 2023-10-17 | End: 2023-10-17 | Stop reason: ALTCHOICE

## 2023-10-17 RX ORDER — SODIUM CHLORIDE, SODIUM LACTATE, POTASSIUM CHLORIDE, CALCIUM CHLORIDE 600; 310; 30; 20 MG/100ML; MG/100ML; MG/100ML; MG/100ML
INJECTION, SOLUTION INTRAVENOUS CONTINUOUS
Status: DISCONTINUED | OUTPATIENT
Start: 2023-10-17 | End: 2023-10-17 | Stop reason: HOSPADM

## 2023-10-17 RX ORDER — DEXAMETHASONE SODIUM PHOSPHATE 4 MG/ML
INJECTION, SOLUTION INTRA-ARTICULAR; INTRALESIONAL; INTRAMUSCULAR; INTRAVENOUS; SOFT TISSUE PRN
Status: DISCONTINUED | OUTPATIENT
Start: 2023-10-17 | End: 2023-10-17 | Stop reason: SDUPTHER

## 2023-10-17 RX ORDER — ONDANSETRON 2 MG/ML
INJECTION INTRAMUSCULAR; INTRAVENOUS PRN
Status: DISCONTINUED | OUTPATIENT
Start: 2023-10-17 | End: 2023-10-17 | Stop reason: SDUPTHER

## 2023-10-17 RX ORDER — SODIUM CHLORIDE 9 MG/ML
INJECTION, SOLUTION INTRAVENOUS PRN
Status: DISCONTINUED | OUTPATIENT
Start: 2023-10-17 | End: 2023-10-17 | Stop reason: HOSPADM

## 2023-10-17 RX ORDER — OXYCODONE HYDROCHLORIDE 5 MG/1
5 TABLET ORAL PRN
Status: DISCONTINUED | OUTPATIENT
Start: 2023-10-17 | End: 2023-10-17 | Stop reason: HOSPADM

## 2023-10-17 RX ORDER — SODIUM CHLORIDE 0.9 % (FLUSH) 0.9 %
5-40 SYRINGE (ML) INJECTION EVERY 12 HOURS SCHEDULED
Status: DISCONTINUED | OUTPATIENT
Start: 2023-10-17 | End: 2023-10-17 | Stop reason: HOSPADM

## 2023-10-17 RX ORDER — CEFAZOLIN SODIUM IN 0.9 % NACL 2 G/100 ML
2000 PLASTIC BAG, INJECTION (ML) INTRAVENOUS
Status: COMPLETED | OUTPATIENT
Start: 2023-10-17 | End: 2023-10-17

## 2023-10-17 RX ORDER — FENTANYL CITRATE 50 UG/ML
INJECTION, SOLUTION INTRAMUSCULAR; INTRAVENOUS PRN
Status: DISCONTINUED | OUTPATIENT
Start: 2023-10-17 | End: 2023-10-17 | Stop reason: SDUPTHER

## 2023-10-17 RX ORDER — MIDAZOLAM HYDROCHLORIDE 1 MG/ML
INJECTION INTRAMUSCULAR; INTRAVENOUS PRN
Status: DISCONTINUED | OUTPATIENT
Start: 2023-10-17 | End: 2023-10-17 | Stop reason: SDUPTHER

## 2023-10-17 RX ORDER — ONDANSETRON 2 MG/ML
4 INJECTION INTRAMUSCULAR; INTRAVENOUS EVERY 30 MIN PRN
Status: DISCONTINUED | OUTPATIENT
Start: 2023-10-17 | End: 2023-10-17 | Stop reason: HOSPADM

## 2023-10-17 RX ORDER — OXYCODONE HYDROCHLORIDE 5 MG/1
10 TABLET ORAL PRN
Status: DISCONTINUED | OUTPATIENT
Start: 2023-10-17 | End: 2023-10-17 | Stop reason: HOSPADM

## 2023-10-17 RX ORDER — SODIUM CHLORIDE 0.9 % (FLUSH) 0.9 %
5-40 SYRINGE (ML) INJECTION PRN
Status: DISCONTINUED | OUTPATIENT
Start: 2023-10-17 | End: 2023-10-17 | Stop reason: HOSPADM

## 2023-10-17 RX ADMIN — FENTANYL CITRATE 25 MCG: 50 INJECTION, SOLUTION INTRAMUSCULAR; INTRAVENOUS at 11:21

## 2023-10-17 RX ADMIN — FENTANYL CITRATE 25 MCG: 50 INJECTION, SOLUTION INTRAMUSCULAR; INTRAVENOUS at 12:41

## 2023-10-17 RX ADMIN — PROPOFOL 150 MG: 10 INJECTION, EMULSION INTRAVENOUS at 11:21

## 2023-10-17 RX ADMIN — FENTANYL CITRATE 25 MCG: 50 INJECTION, SOLUTION INTRAMUSCULAR; INTRAVENOUS at 13:29

## 2023-10-17 RX ADMIN — OXYCODONE HYDROCHLORIDE 5 MG: 5 TABLET ORAL at 14:00

## 2023-10-17 RX ADMIN — SODIUM CHLORIDE, SODIUM LACTATE, POTASSIUM CHLORIDE, AND CALCIUM CHLORIDE: .6; .31; .03; .02 INJECTION, SOLUTION INTRAVENOUS at 11:14

## 2023-10-17 RX ADMIN — LIDOCAINE HYDROCHLORIDE 100 MG: 20 INJECTION, SOLUTION INFILTRATION; PERINEURAL at 11:21

## 2023-10-17 RX ADMIN — SODIUM CHLORIDE, SODIUM LACTATE, POTASSIUM CHLORIDE, AND CALCIUM CHLORIDE: .6; .31; .03; .02 INJECTION, SOLUTION INTRAVENOUS at 13:07

## 2023-10-17 RX ADMIN — FENTANYL CITRATE 25 MCG: 50 INJECTION, SOLUTION INTRAMUSCULAR; INTRAVENOUS at 11:51

## 2023-10-17 RX ADMIN — FAMOTIDINE 20 MG: 10 INJECTION, SOLUTION INTRAVENOUS at 10:10

## 2023-10-17 RX ADMIN — ONDANSETRON 4 MG: 2 INJECTION INTRAMUSCULAR; INTRAVENOUS at 14:29

## 2023-10-17 RX ADMIN — DEXAMETHASONE SODIUM PHOSPHATE 8 MG: 4 INJECTION, SOLUTION INTRAMUSCULAR; INTRAVENOUS at 11:29

## 2023-10-17 RX ADMIN — Medication 2000 MG: at 11:28

## 2023-10-17 RX ADMIN — FENTANYL CITRATE 25 MCG: 50 INJECTION, SOLUTION INTRAMUSCULAR; INTRAVENOUS at 12:20

## 2023-10-17 RX ADMIN — ONDANSETRON 4 MG: 2 INJECTION INTRAMUSCULAR; INTRAVENOUS at 12:56

## 2023-10-17 RX ADMIN — TILMANOCEPT 1.1 MILLICURIE: KIT at 11:44

## 2023-10-17 RX ADMIN — MIDAZOLAM 2 MG: 1 INJECTION INTRAMUSCULAR; INTRAVENOUS at 11:14

## 2023-10-17 RX ADMIN — HYDROMORPHONE HYDROCHLORIDE 0.25 MG: 1 INJECTION, SOLUTION INTRAMUSCULAR; INTRAVENOUS; SUBCUTANEOUS at 13:45

## 2023-10-17 ASSESSMENT — PAIN DESCRIPTION - LOCATION
LOCATION: BREAST
LOCATION: BREAST;INCISION
LOCATION: BREAST

## 2023-10-17 ASSESSMENT — PAIN SCALES - GENERAL
PAINLEVEL_OUTOF10: 4
PAINLEVEL_OUTOF10: 4
PAINLEVEL_OUTOF10: 5
PAINLEVEL_OUTOF10: 4

## 2023-10-17 ASSESSMENT — PAIN DESCRIPTION - ORIENTATION
ORIENTATION: RIGHT

## 2023-10-17 ASSESSMENT — PAIN - FUNCTIONAL ASSESSMENT
PAIN_FUNCTIONAL_ASSESSMENT: 0-10
PAIN_FUNCTIONAL_ASSESSMENT: PREVENTS OR INTERFERES SOME ACTIVE ACTIVITIES AND ADLS

## 2023-10-17 ASSESSMENT — PAIN DESCRIPTION - DESCRIPTORS
DESCRIPTORS: STABBING;ACHING
DESCRIPTORS: ACHING

## 2023-10-17 NOTE — H&P
H&P from PCP, Yvette Ch CNP dated 10/6/23 on file, reviewed. No changes. For right RFID tag localized partial mastectomy, sentinel lymph node biopsy today as planned. [place] Du.  DO Lisy, FACS  Breast Surgical Oncology    [facility]  Phone: [phone]

## 2023-10-17 NOTE — PROGRESS NOTES
Patient admitted to [facility] 7 in preparation for surgery, VSS. Consent confirmed. IV inserted into L hand, LR infusing. Belongings in room. NPO since 2130. Samuel hugger in place. Family at bedside, call light within reach.

## 2023-10-17 NOTE — PROGRESS NOTES
Patient arrived to PACU bay 7, phase one initiated. Placed on bedside monitor, VSS. Report obtained from OR RN and anesthesia. Patient on room air. See flowsheets for assessment. Warm blankets applied. Side rails in place, will monitor patient closely.

## 2023-10-17 NOTE — ANESTHESIA POSTPROCEDURE EVALUATION
Department of Anesthesiology  Postprocedure Note    Patient: Rajesh Espinoza  MRN: [id number]  YOB: 1955  Date of evaluation: 10/17/2023      Procedure Summary     Date: 10/17/23 Room / Location: [facility] OR 06 / [address] [place] [address]    Anesthesia Start: 1114 Anesthesia Stop: 1029    Procedure: RIGHT RADIOFREQUENCY IDENTIFICATION TAG LOCALIZED PARTIAL MASTECTOMY, SENTINEL LYMPH NODE BIOPSY (Right: Breast) Diagnosis:       Malignant neoplasm of upper-outer quadrant of right female breast, unspecified estrogen receptor status ([address])      (Malignant neoplasm of upper-outer quadrant of right female breast, unspecified estrogen receptor status ([address]) [C50.411])    Surgeons: Kentrell Rodrigues DO Responsible Provider: Isidoro Cody MD    Anesthesia Type: general ASA Status: 2          Anesthesia Type: No value filed.     Navarro Phase I: Navarro Score: 10    Navarro Phase II: Navarro Score: 10      Anesthesia Post Evaluation    Patient location during evaluation: PACU  Level of consciousness: awake  Airway patency: patent  Nausea & Vomiting: no vomiting  Complications: no  Cardiovascular status: blood pressure returned to baseline  Respiratory status: acceptable  Hydration status: stable  Pain management: adequate

## 2023-10-17 NOTE — DISCHARGE INSTRUCTIONS
DO [facility]  [address]  Phone [phone])      Postoperative Instructions for Breast Surgery (Lumpectomy)  These are general guidelines to help assist in recovery after breast surgery. Please keep in mind all patients recover differently, so please call the office with any questions. General guidelines   Please rest when you feel tired, although it is important to be up and about intermittently. If you had a sentinel lymph node procedure, it is common to have an interval of blue urine and/or stool and blue skin changes of the breast.  Final pathology will take about 3-5 business days to result. The breast surgery office will call you with the results when they are known. Wound care  You have surgical glue over your incision(s). This will slowly come off on its own over time; do not pick at it or place any lotions over it. You may shower Thursday AM. Let warm soapy water run over your incision(s), then pat dry with a clean towel after showering. Do not place ointment or lotions on your incision  Do not take a tub bath where your incision will be submerged in water or go swimming until your incisions are fully healed in 4-8 weeks   Some swelling and bruising in the surgical site is considered normal. It is not normal to have excessive bleeding or drainage from the wound. If you notice this, please call the office immediately. Please call the office if you notice a fever as this may be a sign of infection. Activity  Please wear your surgical bra day and night until your postoperative appointment, with the exception of laundering and bathing. If the surgical bra is uncomfortable or not supportive, you may wear a supportive sports bra instead.    Avoid strenuous activity and vigorous exercise for the next 72 hours  No heavy lifting (nothing over 10 pounds) for 1 week  Walking is a normal activity that can be restarted right away  Avoid any direct

## 2023-10-17 NOTE — PROGRESS NOTES
Patient meets criteria for discharge per policy. Discharge instructions given to patient and family, verbalized understanding. PIV removed. Patient discharged via wheelchair to the care of their family in stable condition.

## 2023-10-17 NOTE — OP NOTE
Breast Surgery Operative Note    Laney Amanda  YOB: 1955  MRN: [id number]    DATE OF PROCEDURE  10/17/23     PREOPERATIVE DIAGNOSIS  Right breast cancer     POSTOPERATIVE DIAGNOSIS  Same    PROCEDURE  1. Injection of blue dye to the right breast  2. Right radiofrequency identification tag-localized partial mastectomy   3. Right sentinel lymph node biopsy    ANESTHESIA  General    SURGEON  Ivelisse Livingston DO     ASSISTANT  Ileana Knight    ESTIMATED BLOOD LOSS  less than 50  ml    COMPLICATIONS  None apparent by completion of procedure    SPECIMENS REMOVED  ID   A : RIGHT BREAST TISSUE MAIN SEGMENT SHORT STITCH SUPERIOR LONG STITCH LATERAL   B : RIGHT BREAST TISSUE MEDIAL MARGIN INK MARKS NEW MARGIN   C : RIGHT BREAST TISSUE SUPERIOR MARGIN, INK MARKS NEW MARGIN   D : RIGHT AXILLARY SENTINAL LYMPH NODES     FINDINGS  The right breast tissue was excised using RFID tag localization. Specimen radiograph demonstrated that the clip and RFID tag were located within the specimen. The right sentinel lymph nodes were identified (blue and hot) and were grossly normal.     POST-OP CONDITION  Stable    DISPOSITION  To PACU    INDICATION FOR PROCEDURE  Laney Amanda is a 70 y.o. woman who was found to have an abnormality in her right  breast on imaging. A core biopsy was performed and revealed invasive ductal carcinoma in the right breast. I felt that she was an acceptable candidate for breast conservation for which she was highly motivated. I also recommended right sentinel lymph node biopsy for axillary staging. She presents today for her planned procedure.  The indications for the planned procedure, along with the potential benefits and risks which include but are not limited to: reactions to medications/anesthesia, pain, infection, bleeding, injury to nerves and/or vessels, wound complications, seroma, lymphedema, numbness, poor cosmetic outcome, scars and need for additional procedures based on final on specimen radiograph. These were marked with ink and passed off. Attention was then turned to the wound. Hemostasis was obtained with electrocautery. The wound was irrigated. Local anesthetic was infiltrated into the soft tissues surrounding the cavity and hemostasis was again confirmed. Clips were placed in the lumpectomy cavity. The tissues were then reapproximated in layers in a superior/inferior direction using 3-0 vicryl. Hemostasis was again confirmed. Attention was then turned to the right axilla. A curvilinear incision was made at the lower edge of the axillary hairline and carried down through the dermis with electrocautery. The subcutaneous tissues were opened and the clavipectoral fascia was incised. Upon entering the axilla, the gamma probe and blue dye were utilized to guide localization of the sentinel nodes. What appeared to be 4 nodes (all 4 radioactive, 2 blue)  lymph nodes were identified and excised. Ex vivo counts were 2389, 1492, 396 and 387. These were then passed off the field. The residual gamma probe activity within the axillary region was minimal.  The axilla was then assessed for other blue channels/nodes and palpably abnormal lymph nodes, of which there were none. Attention was then turned to the wound. Aggressive hemostasis was obtained with electrocautery. The wound was irrigated with copious amounts of sterile saline. 0.5% marcaine was infiltrated into the soft tissues surrounding the cavity and hemostasis was again confirmed. A total of 30 ml was used throughout the procedure. The deep dermal layer was then reapproximated with interrupted 3-0 Vicryl stitches for both incisions. The skin of both incisions was reapproximated with running subcuticular using 4-0 Monocryl. Skin glue and a surgical bra were applied. The patient tolerated this procedure well, was awakened and transferred to the recovery room in stable condition.   All instrument, sponge, and

## 2024-10-14 ENCOUNTER — HOSPITAL ENCOUNTER (OUTPATIENT)
Dept: WOMENS IMAGING | Age: 69
Discharge: HOME | End: 2024-10-14
Payer: MEDICARE

## 2024-10-14 DIAGNOSIS — Z85.3 PERSONAL HISTORY OF MALIGNANT NEOPLASM OF BREAST: ICD-10-CM

## 2024-10-14 PROCEDURE — G0279 TOMOSYNTHESIS, MAMMO: HCPCS

## (undated) DEVICE — GLOVE SURG SZ 65 L12IN FNGR THK79MIL GRN LTX FREE

## (undated) DEVICE — YANKAUER,OPEN TIP,W/O VENT,STERILE: Brand: MEDLINE INDUSTRIES, INC.

## (undated) DEVICE — Device

## (undated) DEVICE — GLOVE,SURG,SENSICARE SLT,LF,PF,6.5: Brand: MEDLINE

## (undated) DEVICE — PENCIL ES L3M BTTN SWCH HOLSTER W/ BLDE ELECTRD EDGE

## (undated) DEVICE — PENCIL SMK EVAC TELSCP 3 M TBNG

## (undated) DEVICE — NEEDLE HYPO 27GA L0.5IN YEL PLAS HUB S STL REG BVL

## (undated) DEVICE — CLIP LIG M BLU TI HRT SHP WIRE HORZ 600 PER BX

## (undated) DEVICE — SUTURE PERMA-HAND SZ 2-0 L30IN NONABSORBABLE BLK L26MM SH K833H

## (undated) DEVICE — GOWN SIRUS NONREIN LG W/TWL: Brand: MEDLINE INDUSTRIES, INC.

## (undated) DEVICE — PROBE SET W/ DRP

## (undated) DEVICE — SYRINGE MED 10ML LUERLOCK TIP W/O SFTY DISP

## (undated) DEVICE — SUTURE VCRL + SZ 3-0 L27IN ABSRB UD L26MM SH 1/2 CIR VCP416H

## (undated) DEVICE — LIQUIBAND RAPID ADHESIVE 36/CS 0.8ML: Brand: MEDLINE

## (undated) DEVICE — BLADE ES ELASTOMERIC COAT INSUL DURABLE BEND UPTO 90DEG

## (undated) DEVICE — SUTURE MCRYL + SZ 4-0 L18IN ABSRB UD L19MM PS-2 3/8 CIR MCP496G